# Patient Record
Sex: MALE | URBAN - METROPOLITAN AREA
[De-identification: names, ages, dates, MRNs, and addresses within clinical notes are randomized per-mention and may not be internally consistent; named-entity substitution may affect disease eponyms.]

---

## 2024-10-22 ENCOUNTER — APPOINTMENT (OUTPATIENT)
Dept: PRIMARY CARE | Facility: CLINIC | Age: 64
End: 2024-10-22

## 2024-10-22 VITALS
DIASTOLIC BLOOD PRESSURE: 82 MMHG | HEART RATE: 88 BPM | HEIGHT: 68 IN | SYSTOLIC BLOOD PRESSURE: 132 MMHG | BODY MASS INDEX: 26.67 KG/M2 | OXYGEN SATURATION: 98 % | WEIGHT: 176 LBS

## 2024-10-22 DIAGNOSIS — C18.9 MALIGNANT NEOPLASM OF COLON, UNSPECIFIED PART OF COLON (MULTI): Primary | ICD-10-CM

## 2024-10-22 DIAGNOSIS — Z12.5 SCREENING PSA (PROSTATE SPECIFIC ANTIGEN): ICD-10-CM

## 2024-10-22 DIAGNOSIS — R94.31 ABNORMAL EKG: ICD-10-CM

## 2024-10-22 DIAGNOSIS — I49.9 IRREGULAR HEART BEAT: ICD-10-CM

## 2024-10-22 PROCEDURE — 99213 OFFICE O/P EST LOW 20 MIN: CPT | Performed by: INTERNAL MEDICINE

## 2024-10-22 PROCEDURE — 3008F BODY MASS INDEX DOCD: CPT | Performed by: INTERNAL MEDICINE

## 2024-10-22 PROCEDURE — 1036F TOBACCO NON-USER: CPT | Performed by: INTERNAL MEDICINE

## 2024-10-22 RX ORDER — MULTIVITAMIN
1 TABLET ORAL DAILY
COMMUNITY

## 2024-10-22 RX ORDER — VALACYCLOVIR HYDROCHLORIDE 1 G/1
500 TABLET, FILM COATED ORAL
COMMUNITY

## 2024-10-22 RX ORDER — IBUPROFEN 600 MG/1
600 TABLET ORAL EVERY 6 HOURS PRN
COMMUNITY

## 2024-10-22 NOTE — PROGRESS NOTES
"Subjective   Patient ID: Binh Catherine is a 63 y.o. male who presents for New Patient Visit.    HPI new pt  Dad rcc.   No fhx colon ca  Dad ashd. 60s/ smoker  No tobacco    Review of Systems  Feels well    Objective   /82   Pulse 88   Ht 1.727 m (5' 8\")   Wt 79.8 kg (176 lb)   SpO2 98%   BMI 26.76 kg/m²     Physical Exam  Gen nad, affect wnl  Heentt eomfg, face symmetric, ncat  Neck w/o la, tm, bruit  Lungs clear   Cv rrr nl s1, s2  Ext w/o edema  Neuro grossly nonfocal  Skin good color    Assessment/Plan   Diagnoses and all orders for this visit:  Malignant neoplasm of colon, unspecified part of colon (Multi)  -     CBC and Auto Differential; Future  Irregular heart beat  -     Comprehensive metabolic panel; Future  -     Tsh With Reflex To Free T4 If Abnormal; Future  -     CBC and Auto Differential; Future  -     Lipid Panel; Future  -     CT cardiac scoring wo IV contrast; Future  Screening PSA (prostate specific antigen)  -     PSA; Future     EKG W/ TRIGEMINY  Rec cx ccs and do stress echo  "

## 2024-11-06 ENCOUNTER — HOSPITAL ENCOUNTER (OUTPATIENT)
Dept: CARDIOLOGY | Facility: CLINIC | Age: 64
Discharge: HOME | End: 2024-11-06

## 2024-11-06 DIAGNOSIS — I49.9 IRREGULAR HEART BEAT: ICD-10-CM

## 2024-11-06 DIAGNOSIS — R94.31 ABNORMAL EKG: ICD-10-CM

## 2024-11-06 PROCEDURE — 93017 CV STRESS TEST TRACING ONLY: CPT

## 2024-11-11 DIAGNOSIS — R94.39 ABNORMAL STRESS TEST: Primary | ICD-10-CM

## 2025-01-22 ENCOUNTER — APPOINTMENT (OUTPATIENT)
Dept: PRIMARY CARE | Facility: CLINIC | Age: 65
End: 2025-01-22

## 2025-01-22 VITALS
BODY MASS INDEX: 26.83 KG/M2 | HEART RATE: 78 BPM | WEIGHT: 177 LBS | SYSTOLIC BLOOD PRESSURE: 146 MMHG | OXYGEN SATURATION: 98 % | DIASTOLIC BLOOD PRESSURE: 88 MMHG | HEIGHT: 68 IN

## 2025-01-22 DIAGNOSIS — J32.9 SINUSITIS, UNSPECIFIED CHRONICITY, UNSPECIFIED LOCATION: ICD-10-CM

## 2025-01-22 DIAGNOSIS — Z86.19 H/O COLD SORES: ICD-10-CM

## 2025-01-22 DIAGNOSIS — H69.93 DYSFUNCTION OF BOTH EUSTACHIAN TUBES: Primary | ICD-10-CM

## 2025-01-22 PROCEDURE — 99213 OFFICE O/P EST LOW 20 MIN: CPT | Performed by: INTERNAL MEDICINE

## 2025-01-22 PROCEDURE — 3008F BODY MASS INDEX DOCD: CPT | Performed by: INTERNAL MEDICINE

## 2025-01-22 PROCEDURE — 1036F TOBACCO NON-USER: CPT | Performed by: INTERNAL MEDICINE

## 2025-01-22 RX ORDER — CEFUROXIME AXETIL 250 MG/1
250 TABLET ORAL 2 TIMES DAILY
Qty: 14 TABLET | Refills: 0 | Status: SHIPPED | OUTPATIENT
Start: 2025-01-22 | End: 2025-01-29

## 2025-01-22 RX ORDER — VALACYCLOVIR HYDROCHLORIDE 1 G/1
TABLET, FILM COATED ORAL
Qty: 16 TABLET | Refills: 0 | Status: SHIPPED | OUTPATIENT
Start: 2025-01-22

## 2025-01-22 RX ORDER — METHYLPREDNISOLONE 4 MG/1
TABLET ORAL
Qty: 21 TABLET | Refills: 0 | Status: SHIPPED | OUTPATIENT
Start: 2025-01-22 | End: 2025-01-28

## 2025-01-22 NOTE — PROGRESS NOTES
"Subjective   Patient ID: Binh Catherine is a 64 y.o. male who presents for Ear Fullness (Bilateral./Med refill needed for Valacyclovir ).    HPI     Review of Systems    Objective   /88   Pulse 78   Ht 1.727 m (5' 8\")   Wt 80.3 kg (177 lb)   SpO2 98%   BMI 26.91 kg/m²     Physical Exam    Assessment/Plan          "

## 2025-01-24 ENCOUNTER — OFFICE VISIT (OUTPATIENT)
Dept: CARDIOLOGY | Facility: CLINIC | Age: 65
End: 2025-01-24

## 2025-01-24 ENCOUNTER — APPOINTMENT (OUTPATIENT)
Dept: CARDIOLOGY | Facility: CLINIC | Age: 65
End: 2025-01-24

## 2025-01-24 VITALS
SYSTOLIC BLOOD PRESSURE: 151 MMHG | HEART RATE: 75 BPM | BODY MASS INDEX: 26.91 KG/M2 | WEIGHT: 177 LBS | OXYGEN SATURATION: 98 % | DIASTOLIC BLOOD PRESSURE: 78 MMHG

## 2025-01-24 DIAGNOSIS — R94.39 ABNORMAL STRESS TEST: ICD-10-CM

## 2025-01-24 DIAGNOSIS — I10 ESSENTIAL HYPERTENSION: ICD-10-CM

## 2025-01-24 DIAGNOSIS — I25.10 CORONARY ARTERY DISEASE INVOLVING NATIVE CORONARY ARTERY OF NATIVE HEART WITHOUT ANGINA PECTORIS: ICD-10-CM

## 2025-01-24 DIAGNOSIS — I49.3 PVC (PREMATURE VENTRICULAR CONTRACTION): Primary | ICD-10-CM

## 2025-01-24 PROCEDURE — 93005 ELECTROCARDIOGRAM TRACING: CPT

## 2025-01-24 PROCEDURE — 93010 ELECTROCARDIOGRAM REPORT: CPT | Performed by: STUDENT IN AN ORGANIZED HEALTH CARE EDUCATION/TRAINING PROGRAM

## 2025-01-24 PROCEDURE — 99205 OFFICE O/P NEW HI 60 MIN: CPT

## 2025-01-24 PROCEDURE — 99215 OFFICE O/P EST HI 40 MIN: CPT

## 2025-01-24 PROCEDURE — 3078F DIAST BP <80 MM HG: CPT

## 2025-01-24 PROCEDURE — 3077F SYST BP >= 140 MM HG: CPT

## 2025-01-24 RX ORDER — ASPIRIN 81 MG/1
81 TABLET ORAL DAILY
Qty: 90 TABLET | Refills: 3 | Status: SHIPPED | OUTPATIENT
Start: 2025-01-24 | End: 2026-01-24

## 2025-01-24 RX ORDER — LISINOPRIL 10 MG/1
10 TABLET ORAL DAILY
Qty: 30 TABLET | Refills: 11 | Status: SHIPPED | OUTPATIENT
Start: 2025-01-24 | End: 2026-01-24

## 2025-01-24 RX ORDER — METOPROLOL TARTRATE 50 MG/1
50 TABLET ORAL ONCE
Qty: 1 TABLET | Refills: 0 | Status: SHIPPED | OUTPATIENT
Start: 2025-01-24 | End: 2025-01-24

## 2025-01-24 NOTE — PROGRESS NOTES
Referred by Dr. Lagos for abnormal stress test     History Of Present Illness:    Bnih Catherine is a 64 y.o. male with PMHx of malignant neoplasm of sigmoid colon, presenting today for follow up after abnormal echo stress test.   Palpitations, feels heart skipping beats.   Denies any CP, SOB, lightheadedness, syncope, orthopnea, PND, lower extremity edema.     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     Past Medical History:  He has a past medical history of Arthritis.    Past Surgical History:  He has a past surgical history that includes Appendectomy; Vasectomy; and Colonoscopy w/ biopsies.      Social History:  He reports that he has never smoked. He has never used smokeless tobacco. He reports current alcohol use. He reports that he does not use drugs.    Family History:  Family History   Problem Relation Name Age of Onset    Diabetes Father      Cancer Father          Kidney Caance that spread to lungs    Heart attack Father  70 - 79    Other (leak valve) Maternal Grandmother      Diabetes Paternal Grandmother      Heart attack Paternal Grandmother       Allergies:  Sulfa (sulfonamide antibiotics)    Outpatient Medications:  Current Outpatient Medications   Medication Instructions    cefuroxime (CEFTIN) 250 mg, oral, 2 times daily    ibuprofen 600 mg, Every 6 hours PRN    methylPREDNISolone (Medrol Dospak) 4 mg tablets Take as directed on package.    multivit with min-folic acid 0.4 mg tablet 1 tablet, Daily    valACYclovir (Valtrex) 1 gram tablet 2 bid for 1 day PRN     Last Recorded Vitals:  Vitals:    01/24/25 0856   BP: 151/78   BP Location: Right arm   Patient Position: Sitting   Pulse: 75   SpO2: 98%   Weight: 80.3 kg (177 lb)     Physical Exam:  General: no acute distress  HEENT: EOMI, no scleral icterus.  Lungs: Clear to auscultation bilaterally without wheezing, rales, or rhonchi.  Cardiovascular: Regular rhythm and rate. Normal S1 and S2. No murmurs, rubs, or gallops are  "appreciated. JVP normal.  Abdomen: Soft, nontender, nondistended. Bowel sounds present.  Extremities: Warm and well perfused with equal 2+ pulses bilaterally.  No edema.  Neurologic: Alert and oriented x3.     Last Labs:  CBC -  No results found for: \"WBC\", \"HGB\", \"HCT\", \"MCV\", \"PLT\"    CMP -  No results found for: \"CALCIUM\", \"PHOS\", \"PROT\", \"ALBUMIN\", \"AST\", \"ALT\", \"ALKPHOS\", \"BILITOT\"    LIPID PANEL -   No results found for: \"CHOL\", \"TRIG\", \"HDL\", \"CHHDL\", \"LDLF\", \"VLDL\", \"NHDL\"    RENAL FUNCTION PANEL -   No results found for: \"GLUCOSE\", \"NA\", \"K\", \"CL\", \"CO2\", \"ANIONGAP\", \"BUN\", \"CREATININE\", \"GFRMALE\", \"CALCIUM\", \"PHOS\", \"ALBUMIN\"     No results found for: \"BNP\", \"HGBA1C\"    Last Cardiology Tests:  ECG:  ECG 12 Lead     Echo:  No results found for this or any previous visit from the past 1095 days.    Ejection Fractions:  No results found for: \"EF\"    Cath:  No results found for this or any previous visit from the past 1095 days.    Stress Test:  Echocardiogram Stress Test 11/06/2024   1. Adequate level of stress achieved.   2. Low normal global left ventricular systolic function.   3. No clinical or electrocardiographic evidence for ischemia at a maximal workload.   4. The resting ejection fraction was estimated at 50 to 55% with a peak exercise ejection fraction estimated at 60 to 65%.   5. At peak, there are stress-induced regional wall motion abnormalities.   6. Subtle hypokinesis with peak exercise involving the mid anterior wall and apex.   7. Abnormal Stress Test.    Cardiac Imaging:  CT chest 10/14/2016  The thoracic aorta appears normal in course and caliber.  There is a   common origin of the brachiocephalic and left common carotid arteries, a   normal anatomic variant.   The main and central pulmonary arteries are   within normal limits of diameter.  No specific cardiac chamber   enlargement is identified.  There is no pericardial effusion.  There are   mild atherosclerotic calcifications of the left " coronary artery   circulation.  The esophagus is nondilated.     CT abdomen 10/14/2016  Vasculature:  Patent separate origins of the hepatic artery and splenic   artery from the aorta.  SMA and SUSHILA are patent.  The portal vein and   branches, splenic vein, SMV, and hepatic veins are patent.       I have personally reviewed most recent PCP, cardiology, vascular, studies and/or documentation.      Assessment/Plan   Abnormal stress echo, CT coronary heart flow, denies any symptoms whatsoever today.     CAD,     HTN, elevated, /78 today. Lisinopril 10mg daily. Check BP at home and keep log.     HLD, 11/23/24 , HDL 46, trig 208,     PVC,     Follow up with me in 1 month.    Christiane Kay, APRN-CNP

## 2025-01-29 PROBLEM — I10 ESSENTIAL HYPERTENSION: Status: ACTIVE | Noted: 2025-01-29

## 2025-01-31 LAB
ATRIAL RATE: 73 BPM
P AXIS: 61 DEGREES
P OFFSET: 203 MS
P ONSET: 151 MS
PR INTERVAL: 146 MS
Q ONSET: 224 MS
QRS COUNT: 12 BEATS
QRS DURATION: 86 MS
QT INTERVAL: 406 MS
QTC CALCULATION(BAZETT): 447 MS
QTC FREDERICIA: 433 MS
R AXIS: 15 DEGREES
T AXIS: 49 DEGREES
T OFFSET: 427 MS
VENTRICULAR RATE: 73 BPM

## 2025-02-03 DIAGNOSIS — E87.5 HYPERKALEMIA: Primary | ICD-10-CM

## 2025-02-04 ENCOUNTER — HOSPITAL ENCOUNTER (OUTPATIENT)
Dept: RADIOLOGY | Facility: HOSPITAL | Age: 65
Discharge: HOME | End: 2025-02-04

## 2025-02-04 VITALS
HEART RATE: 62 BPM | RESPIRATION RATE: 15 BRPM | OXYGEN SATURATION: 95 % | DIASTOLIC BLOOD PRESSURE: 70 MMHG | SYSTOLIC BLOOD PRESSURE: 150 MMHG

## 2025-02-04 DIAGNOSIS — R07.9 CHEST PAIN: ICD-10-CM

## 2025-02-04 DIAGNOSIS — R93.1 ABNORMAL FINDINGS ON DIAGNOSTIC IMAGING OF HEART AND CORONARY CIRCULATION: ICD-10-CM

## 2025-02-04 DIAGNOSIS — R94.39 ABNORMAL STRESS TEST: ICD-10-CM

## 2025-02-04 DIAGNOSIS — I25.10 CORONARY ARTERY DISEASE INVOLVING NATIVE CORONARY ARTERY OF NATIVE HEART WITHOUT ANGINA PECTORIS: ICD-10-CM

## 2025-02-04 PROCEDURE — 75571 CT HRT W/O DYE W/CA TEST: CPT

## 2025-02-04 PROCEDURE — 2500000001 HC RX 250 WO HCPCS SELF ADMINISTERED DRUGS (ALT 637 FOR MEDICARE OP): Performed by: RADIOLOGY

## 2025-02-04 PROCEDURE — 75580 N-INVAS EST C FFR SW ALY CTA: CPT

## 2025-02-04 PROCEDURE — 2550000001 HC RX 255 CONTRASTS

## 2025-02-04 PROCEDURE — 75574 CT ANGIO HRT W/3D IMAGE: CPT

## 2025-02-04 RX ORDER — NITROGLYCERIN 0.4 MG/1
0.8 TABLET SUBLINGUAL ONCE
Status: COMPLETED | OUTPATIENT
Start: 2025-02-04 | End: 2025-02-04

## 2025-02-04 RX ADMIN — IOHEXOL 90 ML: 350 INJECTION, SOLUTION INTRAVENOUS at 15:08

## 2025-02-04 RX ADMIN — NITROGLYCERIN 0.8 MG: 0.4 TABLET SUBLINGUAL at 14:48

## 2025-02-10 ENCOUNTER — OFFICE VISIT (OUTPATIENT)
Dept: CARDIOLOGY | Facility: CLINIC | Age: 65
End: 2025-02-10

## 2025-02-10 VITALS
WEIGHT: 173 LBS | OXYGEN SATURATION: 97 % | SYSTOLIC BLOOD PRESSURE: 138 MMHG | BODY MASS INDEX: 26.3 KG/M2 | DIASTOLIC BLOOD PRESSURE: 87 MMHG | HEART RATE: 77 BPM

## 2025-02-10 DIAGNOSIS — I49.3 PVC (PREMATURE VENTRICULAR CONTRACTION): ICD-10-CM

## 2025-02-10 DIAGNOSIS — R94.39 ABNORMAL STRESS TEST: Primary | ICD-10-CM

## 2025-02-10 DIAGNOSIS — I49.9 IRREGULAR HEART BEAT: ICD-10-CM

## 2025-02-10 DIAGNOSIS — I25.10 CORONARY ARTERY DISEASE INVOLVING NATIVE CORONARY ARTERY OF NATIVE HEART WITHOUT ANGINA PECTORIS: ICD-10-CM

## 2025-02-10 DIAGNOSIS — R94.31 ABNORMAL EKG: ICD-10-CM

## 2025-02-10 DIAGNOSIS — E78.2 MIXED HYPERLIPIDEMIA: ICD-10-CM

## 2025-02-10 DIAGNOSIS — I10 ESSENTIAL HYPERTENSION: ICD-10-CM

## 2025-02-10 PROCEDURE — 1036F TOBACCO NON-USER: CPT

## 2025-02-10 PROCEDURE — 3079F DIAST BP 80-89 MM HG: CPT

## 2025-02-10 PROCEDURE — 3075F SYST BP GE 130 - 139MM HG: CPT

## 2025-02-10 PROCEDURE — 99214 OFFICE O/P EST MOD 30 MIN: CPT

## 2025-02-10 RX ORDER — UBIDECARENONE 30 MG
30 CAPSULE ORAL DAILY
Qty: 30 CAPSULE | Refills: 11 | Status: SHIPPED | OUTPATIENT
Start: 2025-02-10 | End: 2026-02-10

## 2025-02-10 RX ORDER — ATORVASTATIN CALCIUM 40 MG/1
40 TABLET, FILM COATED ORAL DAILY
Qty: 30 TABLET | Refills: 11 | Status: SHIPPED | OUTPATIENT
Start: 2025-02-10 | End: 2026-02-10

## 2025-02-10 NOTE — PROGRESS NOTES
Chief complaint  Follow-up after testing    History Of Present Illness:    I am seeing Vasile today for follow-up after testing. He reports he has been feeling fine since last seen with me in the office. Patient denies any CP, SOB, palpitations, lightheadedness, syncope, orthopnea, PND, lower extremity edema.     1/24/2025  Binh Catherine is a 64 y.o. male with PMHx of malignant neoplasm of sigmoid colon, presenting today for follow up after abnormal echo stress test. Patient admits to having occasional palpitations that are very infrequent. He denies any CP, SOB, lightheadedness, syncope, orthopnea, PND, lower extremity edema. 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     Past Medical History:  He has a past medical history of Arthritis.    Past Surgical History:  He has a past surgical history that includes Appendectomy; Vasectomy; and Colonoscopy w/ biopsies.      Social History:  He reports that he has never smoked. He has never used smokeless tobacco. He reports current alcohol use. He reports that he does not use drugs.    Family History:  Family History   Problem Relation Name Age of Onset    Diabetes Father      Cancer Father          Kidney Caance that spread to lungs    Heart attack Father  70 - 79    Other (leak valve) Maternal Grandmother      Diabetes Paternal Grandmother      Heart attack Paternal Grandmother       Allergies:  Sulfa (sulfonamide antibiotics)    Outpatient Medications:  Current Outpatient Medications   Medication Instructions    aspirin 81 mg, oral, Daily    atorvastatin (LIPITOR) 40 mg, oral, Daily    co-enzyme Q-10 30 mg, oral, Daily    ibuprofen 600 mg, Every 6 hours PRN    lisinopril 10 mg, oral, Daily    multivit with min-folic acid 0.4 mg tablet 1 tablet, Daily    valACYclovir (Valtrex) 1 gram tablet 2 bid for 1 day PRN     Last Recorded Vitals:  Vitals:    02/10/25 0808   BP: 138/87   BP Location: Left arm   Patient Position: Sitting   Pulse:  "77   SpO2: 97%   Weight: 78.5 kg (173 lb)     Physical Exam:  General: no acute distress  HEENT: EOMI, no scleral icterus.  Lungs: Clear to auscultation bilaterally without wheezing, rales, or rhonchi.  Cardiovascular: Regular rhythm and rate. Normal S1 and S2. No murmurs, rubs, or gallops are appreciated. JVP normal.  Abdomen: Soft, nontender, nondistended. Bowel sounds present.  Extremities: Warm and well perfused with equal 2+ pulses bilaterally.  No edema.  Neurologic: Alert and oriented x3.     Last Labs:  CBC -  No results found for: \"WBC\", \"HGB\", \"HCT\", \"MCV\", \"PLT\"    CMP -  No results found for: \"CALCIUM\", \"PHOS\", \"PROT\", \"ALBUMIN\", \"AST\", \"ALT\", \"ALKPHOS\", \"BILITOT\"    LIPID PANEL -   No results found for: \"CHOL\", \"TRIG\", \"HDL\", \"CHHDL\", \"LDLF\", \"VLDL\", \"NHDL\"    RENAL FUNCTION PANEL -   No results found for: \"GLUCOSE\", \"NA\", \"K\", \"CL\", \"CO2\", \"ANIONGAP\", \"BUN\", \"CREATININE\", \"GFRMALE\", \"CALCIUM\", \"PHOS\", \"ALBUMIN\"     No results found for: \"BNP\", \"HGBA1C\"    Last Cardiology Tests:  ECG:  ECG 12 Lead 1/24/2025  Sinus rhythm with frequent Premature ventricular complexes     Echo:  No results found for this or any previous visit from the past 1095 days.    Ejection Fractions:  No results found for: \"EF\"    Cath:  No results found for this or any previous visit from the past 1095 days.    Stress Test:  Echocardiogram Stress Test 11/06/2024   1. Adequate level of stress achieved.   2. Low normal global left ventricular systolic function.   3. No clinical or electrocardiographic evidence for ischemia at a maximal workload.   4. The resting ejection fraction was estimated at 50 to 55% with a peak exercise ejection fraction estimated at 60 to 65%.   5. At peak, there are stress-induced regional wall motion abnormalities.   6. Subtle hypokinesis with peak exercise involving the mid anterior wall and apex.   7. Abnormal Stress Test.    Cardiac Imaging:  CT coronary with heartflow 2/4/2025  The CT FFR analysis " demonstrate absence of hemodynamically  significant coronary artery stenosis. Specifically,  1. There is mild gradient drop across the proximal to mid LAD as well  as 2nd diagonal branch stenoses with values of up to 0.88, consistent  with absence of hemodynamically significant stenosis.  2. There is mild gradient drop within proximal to mid RCA with values  of up to 0.89 distal to the stenotic segment, consistent with absence  of hemodynamically significant stenosis.  3. There is no modeled stenosis within LCX with values of 0.95 within  the distal vessel.    1. STENOSIS: Extensive three-vessel coronary artery atherosclerosis.  Within limitation from beam hardening artifacts secondary to dense  coronary artery calcifications, there is suspected up to moderate  stenosis (50-69%) within mid LAD as well as 2nd diagonal branch. Up  to mild stenosis (25-49%) within proximal to mid RCA as well as  proximal LCX and 2nd obtuse marginal branch. CT FFR results will be  dictated as an addendum when available.  2. Total coronary artery calcium score of 1,383, indicating extensive  amount of calcified coronary plaque.    CT chest 10/14/2016  The thoracic aorta appears normal in course and caliber.  There is a   common origin of the brachiocephalic and left common carotid arteries, a   normal anatomic variant.   The main and central pulmonary arteries are   within normal limits of diameter.  No specific cardiac chamber   enlargement is identified.  There is no pericardial effusion.  There are   mild atherosclerotic calcifications of the left coronary artery   circulation.  The esophagus is nondilated.     CT abdomen 10/14/2016  Vasculature:  Patent separate origins of the hepatic artery and splenic   artery from the aorta.  SMA and SUSHILA are patent.  The portal vein and   branches, splenic vein, SMV, and hepatic veins are patent.       I have personally reviewed most recent PCP, cardiology, vascular, studies and/or documentation.       Assessment/Plan   Abnormal stress echo, patient denies any symptoms whatsoever today. EKG in office showing NSR with PVCs.  He did undergo CT coronary with heart flow which showed extensive three-vessel coronary artery arthrosclerosis, however the CT FFR absence of hemodynamically significant coronary artery disease mild disease in the LAD and second diagonal, mild disease in RCA, and moderate stenosis within LCx (2/4/25).  Patient denies any complaints of chest pain at today's visit whatsoever.    CAD, CT coronary with heart flow noted extensive three-vessel coronary artery arthrosclerosis with CT calcium score of 1,383 units, and no hemodynamically significant stenosis requiring intervention (2/4/25).  Continue aspirin therapy and we started him on statin therapy today.    HTN, elevated, /87 today. Lisinopril 10mg daily. Check BP at home and keep log to present at next visit. If continue to be elevated may need to adjust dose.    HLD, 11/23/24 , HDL 46, trig 208, he is not on any cholesterol lowering medications.  Elevated CT calcium score.  Goal LDL <55.  Atorvastatin 40 mg initiated today. Repeat fasting labs in 3 months.  He did note history of previous myalgias due to statins.  I did ask him to take co-Q10 enzymes with a statin and to take his statin at nighttime.    PVC, noted on EKG. He does admit to having occasional palpitations.     Follow up with me in 3 months.    Christiane Kay, APRN-CNP

## 2025-05-16 ENCOUNTER — OFFICE VISIT (OUTPATIENT)
Dept: CARDIOLOGY | Facility: CLINIC | Age: 65
End: 2025-05-16
Payer: COMMERCIAL

## 2025-05-16 VITALS
HEART RATE: 83 BPM | HEIGHT: 68 IN | OXYGEN SATURATION: 98 % | SYSTOLIC BLOOD PRESSURE: 115 MMHG | BODY MASS INDEX: 26.22 KG/M2 | WEIGHT: 173 LBS | DIASTOLIC BLOOD PRESSURE: 72 MMHG

## 2025-05-16 DIAGNOSIS — R94.31 ABNORMAL EKG: ICD-10-CM

## 2025-05-16 DIAGNOSIS — E78.2 MIXED HYPERLIPIDEMIA: ICD-10-CM

## 2025-05-16 DIAGNOSIS — I25.10 CORONARY ARTERY DISEASE INVOLVING NATIVE CORONARY ARTERY OF NATIVE HEART WITHOUT ANGINA PECTORIS: ICD-10-CM

## 2025-05-16 DIAGNOSIS — R94.39 ABNORMAL STRESS TEST: ICD-10-CM

## 2025-05-16 DIAGNOSIS — I49.9 IRREGULAR HEART BEAT: ICD-10-CM

## 2025-05-16 DIAGNOSIS — I49.3 PVC (PREMATURE VENTRICULAR CONTRACTION): ICD-10-CM

## 2025-05-16 DIAGNOSIS — I10 ESSENTIAL HYPERTENSION: Primary | ICD-10-CM

## 2025-05-16 PROCEDURE — 3074F SYST BP LT 130 MM HG: CPT

## 2025-05-16 PROCEDURE — 1036F TOBACCO NON-USER: CPT

## 2025-05-16 PROCEDURE — 99214 OFFICE O/P EST MOD 30 MIN: CPT

## 2025-05-16 PROCEDURE — 3078F DIAST BP <80 MM HG: CPT

## 2025-05-16 PROCEDURE — 3008F BODY MASS INDEX DOCD: CPT

## 2025-05-16 ASSESSMENT — ENCOUNTER SYMPTOMS
DEPRESSION: 0
OCCASIONAL FEELINGS OF UNSTEADINESS: 0
LOSS OF SENSATION IN FEET: 0

## 2025-05-16 NOTE — PROGRESS NOTES
Chief complaint  BP Follow-up     History Of Present Illness:    Vasile is here today for blood pressure follow up. He reports he has been getting good blood pressure readings at home and BP run in the 100s-120s/70s-80s. He denies any new cardiac symptoms and reports he has been feeling well.  He does mention that he had some swelling in his right knee and felt like his great toe on the left foot needs to be cracked, he reports he is concerned for gout.  Symptoms of gout discussed with patient at today's visit and his presentation does not appear to be gout, low suspicion. The patient denies CP, SOB, palpitations, lightheadedness, syncope, orthopnea, PND, lower extremity edema.     2/10/2025  I am seeing Vasile today for follow-up after testing. He reports he has been feeling fine since last seen with me in the office. Patient denies any CP, SOB, palpitations, lightheadedness, syncope, orthopnea, PND, lower extremity edema.     1/24/2025  Binh Catherine is a 64 y.o. male with PMHx of malignant neoplasm of sigmoid colon, presenting today for follow up after abnormal echo stress test. Patient admits to having occasional palpitations that are very infrequent. He denies any CP, SOB, lightheadedness, syncope, orthopnea, PND, lower extremity edema. 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     Past Medical History:  He has a past medical history of Arthritis.    Past Surgical History:  He has a past surgical history that includes Appendectomy; Vasectomy; and Colonoscopy w/ biopsies.      Social History:  He reports that he has never smoked. He has never used smokeless tobacco. He reports current alcohol use. He reports that he does not use drugs.    Family History:  Family History   Problem Relation Name Age of Onset    Diabetes Father      Cancer Father          Kidney Caance that spread to lungs    Heart attack Father  70 - 79    Other (leak valve) Maternal Grandmother      Diabetes  "Paternal Grandmother      Heart attack Paternal Grandmother       Allergies:  Sulfa (sulfonamide antibiotics)    Outpatient Medications:  Current Outpatient Medications   Medication Instructions    aspirin 81 mg, oral, Daily    atorvastatin (LIPITOR) 40 mg, oral, Daily    co-enzyme Q-10 30 mg, oral, Daily    ibuprofen 600 mg, Every 6 hours PRN    lisinopril 10 mg, oral, Daily    multivit with min-folic acid 0.4 mg tablet 1 tablet, Daily    valACYclovir (Valtrex) 1 gram tablet 2 bid for 1 day PRN     Last Recorded Vitals:  Vitals:    05/16/25 0825   BP: 115/72   BP Location: Left arm   Patient Position: Sitting   Pulse: 83   SpO2: 98%   Weight: 78.5 kg (173 lb)   Height: 1.727 m (5' 8\")     Physical Exam:  General: no acute distress  HEENT: EOMI, no scleral icterus.  Lungs: Clear to auscultation bilaterally without wheezing, rales, or rhonchi.  Cardiovascular: Regular rhythm and rate. Normal S1 and S2. No murmurs, rubs, or gallops are appreciated. JVP normal.  Abdomen: Soft, nontender, nondistended. Bowel sounds present.  Extremities: Warm and well perfused with equal 2+ pulses bilaterally.  No edema.  Neurologic: Alert and oriented x3.     Last Labs:  CBC -  No results found for: \"WBC\", \"HGB\", \"HCT\", \"MCV\", \"PLT\"    CMP -  No results found for: \"CALCIUM\", \"PHOS\", \"PROT\", \"ALBUMIN\", \"AST\", \"ALT\", \"ALKPHOS\", \"BILITOT\"    LIPID PANEL -   No results found for: \"CHOL\", \"TRIG\", \"HDL\", \"CHHDL\", \"LDLF\", \"VLDL\", \"NHDL\"    RENAL FUNCTION PANEL -   No results found for: \"GLUCOSE\", \"NA\", \"K\", \"CL\", \"CO2\", \"ANIONGAP\", \"BUN\", \"CREATININE\", \"GFRMALE\", \"CALCIUM\", \"PHOS\", \"ALBUMIN\"     No results found for: \"BNP\", \"HGBA1C\"    Last Cardiology Tests:  ECG:  ECG 12 Lead 1/24/2025  Sinus rhythm with frequent Premature ventricular complexes     Echo:  No results found for this or any previous visit from the past 1095 days.    Ejection Fractions:  No results found for: \"EF\"    Cath:  No results found for this or any previous visit from " the past 1095 days.    Stress Test:  Echocardiogram Stress Test 11/06/2024   1. Adequate level of stress achieved.   2. Low normal global left ventricular systolic function.   3. No clinical or electrocardiographic evidence for ischemia at a maximal workload.   4. The resting ejection fraction was estimated at 50 to 55% with a peak exercise ejection fraction estimated at 60 to 65%.   5. At peak, there are stress-induced regional wall motion abnormalities.   6. Subtle hypokinesis with peak exercise involving the mid anterior wall and apex.   7. Abnormal Stress Test.    Cardiac Imaging:  CT coronary with heartflow 2/4/2025  The CT FFR analysis demonstrate absence of hemodynamically  significant coronary artery stenosis. Specifically,  1. There is mild gradient drop across the proximal to mid LAD as well  as 2nd diagonal branch stenoses with values of up to 0.88, consistent  with absence of hemodynamically significant stenosis.  2. There is mild gradient drop within proximal to mid RCA with values  of up to 0.89 distal to the stenotic segment, consistent with absence  of hemodynamically significant stenosis.  3. There is no modeled stenosis within LCX with values of 0.95 within  the distal vessel.    1. STENOSIS: Extensive three-vessel coronary artery atherosclerosis.  Within limitation from beam hardening artifacts secondary to dense  coronary artery calcifications, there is suspected up to moderate  stenosis (50-69%) within mid LAD as well as 2nd diagonal branch. Up  to mild stenosis (25-49%) within proximal to mid RCA as well as  proximal LCX and 2nd obtuse marginal branch. CT FFR results will be  dictated as an addendum when available.  2. Total coronary artery calcium score of 1,383, indicating extensive  amount of calcified coronary plaque.    CT chest 10/14/2016  The thoracic aorta appears normal in course and caliber.  There is a   common origin of the brachiocephalic and left common carotid arteries, a    normal anatomic variant.   The main and central pulmonary arteries are   within normal limits of diameter.  No specific cardiac chamber   enlargement is identified.  There is no pericardial effusion.  There are   mild atherosclerotic calcifications of the left coronary artery   circulation.  The esophagus is nondilated.     CT abdomen 10/14/2016  Vasculature:  Patent separate origins of the hepatic artery and splenic   artery from the aorta.  SMA and SUSHILA are patent.  The portal vein and   branches, splenic vein, SMV, and hepatic veins are patent.       I have personally reviewed most recent PCP, cardiology, vascular, studies and/or documentation.      Assessment/Plan   CAD, patient had an abnormal stress echo (11/6/2024). EKG in office showing NSR with PVCs. CT coronary with heart flow noted extensive three-vessel coronary artery arthrosclerosis with CT calcium score of 1,383 units, and no hemodynamically significant stenosis requiring intervention (2/4/25). He denies any complaints of chest pain at today's visit whatsoever. Continue aspirin therapy and we started him on statin therapy today.     HTN, well-controlled, /72 today.  He is on lisinopril 10mg daily.     HLD, 11/23/24 , HDL 46, trig 208.  He was started on atorvastatin 40 mg daily and co-Q10. He did note history of previous myalgias due to statins.  He reports he has been tolerating the medication well and denies any complaints. Repeat fasting blood work ordered at last visit and pending to be done.    PVC, noted on EKG. He does admit to having occasional palpitations.     Follow up with me in 6 months.    Christiane Kay, KISHAN-CNP

## 2025-07-02 DIAGNOSIS — E78.2 MIXED HYPERLIPIDEMIA: ICD-10-CM

## 2025-07-02 DIAGNOSIS — I10 ESSENTIAL HYPERTENSION: ICD-10-CM

## 2025-07-02 DIAGNOSIS — I25.10 CORONARY ARTERY DISEASE INVOLVING NATIVE CORONARY ARTERY OF NATIVE HEART WITHOUT ANGINA PECTORIS: ICD-10-CM

## 2025-07-02 RX ORDER — LISINOPRIL 10 MG/1
10 TABLET ORAL DAILY
Qty: 30 TABLET | Refills: 11 | Status: SHIPPED | OUTPATIENT
Start: 2025-07-02 | End: 2025-07-03 | Stop reason: SDUPTHER

## 2025-07-02 RX ORDER — ATORVASTATIN CALCIUM 40 MG/1
40 TABLET, FILM COATED ORAL DAILY
Qty: 90 TABLET | Refills: 3 | Status: SHIPPED | OUTPATIENT
Start: 2025-07-02 | End: 2026-07-02

## 2025-07-03 DIAGNOSIS — I25.10 CORONARY ARTERY DISEASE INVOLVING NATIVE CORONARY ARTERY OF NATIVE HEART WITHOUT ANGINA PECTORIS: ICD-10-CM

## 2025-07-03 DIAGNOSIS — E78.2 MIXED HYPERLIPIDEMIA: ICD-10-CM

## 2025-07-03 RX ORDER — LISINOPRIL 10 MG/1
10 TABLET ORAL DAILY
Qty: 90 TABLET | Refills: 3 | Status: SHIPPED | OUTPATIENT
Start: 2025-07-03 | End: 2026-07-03

## 2025-07-22 ENCOUNTER — APPOINTMENT (OUTPATIENT)
Dept: PRIMARY CARE | Facility: CLINIC | Age: 65
End: 2025-07-22

## 2025-07-22 VITALS
SYSTOLIC BLOOD PRESSURE: 138 MMHG | WEIGHT: 171 LBS | DIASTOLIC BLOOD PRESSURE: 82 MMHG | HEIGHT: 68 IN | OXYGEN SATURATION: 97 % | BODY MASS INDEX: 25.91 KG/M2 | HEART RATE: 69 BPM

## 2025-07-22 DIAGNOSIS — C18.9 MALIGNANT NEOPLASM OF COLON, UNSPECIFIED PART OF COLON (MULTI): ICD-10-CM

## 2025-07-22 DIAGNOSIS — I25.10 CORONARY ARTERY DISEASE INVOLVING NATIVE CORONARY ARTERY OF NATIVE HEART WITHOUT ANGINA PECTORIS: Primary | ICD-10-CM

## 2025-07-22 DIAGNOSIS — R73.02 IGT (IMPAIRED GLUCOSE TOLERANCE): ICD-10-CM

## 2025-07-22 DIAGNOSIS — Z86.19 H/O COLD SORES: ICD-10-CM

## 2025-07-22 PROCEDURE — 3075F SYST BP GE 130 - 139MM HG: CPT | Performed by: INTERNAL MEDICINE

## 2025-07-22 PROCEDURE — 99213 OFFICE O/P EST LOW 20 MIN: CPT | Performed by: INTERNAL MEDICINE

## 2025-07-22 PROCEDURE — 3079F DIAST BP 80-89 MM HG: CPT | Performed by: INTERNAL MEDICINE

## 2025-07-22 PROCEDURE — 3008F BODY MASS INDEX DOCD: CPT | Performed by: INTERNAL MEDICINE

## 2025-07-22 RX ORDER — VALACYCLOVIR HYDROCHLORIDE 1 G/1
TABLET, FILM COATED ORAL
Qty: 16 TABLET | Refills: 5 | Status: SHIPPED | OUTPATIENT
Start: 2025-07-22

## 2025-07-22 RX ORDER — ROSUVASTATIN CALCIUM 40 MG/1
40 TABLET, COATED ORAL DAILY
Qty: 100 TABLET | Refills: 3 | Status: SHIPPED | OUTPATIENT
Start: 2025-07-22 | End: 2026-08-26

## 2025-07-22 NOTE — PROGRESS NOTES
"Subjective   Patient ID: Binh Catherine is a 64 y.o. male who presents for Follow-up (Pt needs refill on Valacyclovir ).    HPI need rf valtrex  Fu ashd, pvcs, htn, hyperlipidemia  Feels well  No problems    Review of Systems  As above    Objective   /82   Pulse 69   Ht 1.727 m (5' 8\")   Wt 77.6 kg (171 lb)   SpO2 97%   BMI 26.00 kg/m²     Physical Exam  GEN nad, Affect wnl  Heent ncat, eomfg, face symmetric  Skin good color  Resp breathing easily  No p/m retardation or agitation  Thinking appropriate  Oriented  Reviewed labs, cardio notes, ct    Assessment/Plan   Diagnoses and all orders for this visit:  Coronary artery disease involving native coronary artery of native heart without angina pectoris  -     rosuvastatin (Crestor) 40 mg tablet; Take 1 tablet (40 mg) by mouth once daily.  -     ALT; Future  -     Lipid Panel; Future  IGT (impaired glucose tolerance)  -     Hemoglobin A1C; Future  -     Glucose, fasting; Future  H/O cold sores  -     valACYclovir (Valtrex) 1 gram tablet; 2 bid for 1 day PRN    3 mos w/ labs     "

## 2025-07-24 ASSESSMENT — PATIENT HEALTH QUESTIONNAIRE - PHQ9
SUM OF ALL RESPONSES TO PHQ9 QUESTIONS 1 AND 2: 0
2. FEELING DOWN, DEPRESSED OR HOPELESS: NOT AT ALL
1. LITTLE INTEREST OR PLEASURE IN DOING THINGS: NOT AT ALL

## 2025-07-28 PROBLEM — C18.9 MALIGNANT NEOPLASM OF COLON, UNSPECIFIED PART OF COLON (MULTI): Status: ACTIVE | Noted: 2025-07-28

## 2025-11-03 ENCOUNTER — APPOINTMENT (OUTPATIENT)
Dept: PRIMARY CARE | Facility: CLINIC | Age: 65
End: 2025-11-03
Payer: COMMERCIAL